# Patient Record
Sex: FEMALE | Race: OTHER | URBAN - METROPOLITAN AREA
[De-identification: names, ages, dates, MRNs, and addresses within clinical notes are randomized per-mention and may not be internally consistent; named-entity substitution may affect disease eponyms.]

---

## 2018-09-06 ENCOUNTER — OFFICE VISIT (OUTPATIENT)
Dept: SURGERY | Age: 63
End: 2018-09-06

## 2018-09-06 ENCOUNTER — TELEPHONE (OUTPATIENT)
Dept: SURGERY | Age: 63
End: 2018-09-06

## 2018-09-06 ENCOUNTER — HOSPITAL ENCOUNTER (OUTPATIENT)
Dept: LAB | Age: 63
Discharge: HOME | End: 2018-09-06
Payer: SELF-PAY

## 2018-09-06 VITALS
HEIGHT: 60 IN | HEART RATE: 67 BPM | OXYGEN SATURATION: 99 % | BODY MASS INDEX: 23.01 KG/M2 | DIASTOLIC BLOOD PRESSURE: 66 MMHG | SYSTOLIC BLOOD PRESSURE: 148 MMHG | WEIGHT: 117.2 LBS | RESPIRATION RATE: 16 BRPM | TEMPERATURE: 98.1 F

## 2018-09-06 DIAGNOSIS — C50.412 MALIGNANT NEOPLASM OF UPPER-OUTER QUADRANT OF LEFT BREAST IN FEMALE, ESTROGEN RECEPTOR POSITIVE (HCC): Primary | ICD-10-CM

## 2018-09-06 DIAGNOSIS — L90.0 LICHEN SCLEROSUS: ICD-10-CM

## 2018-09-06 DIAGNOSIS — Z17.0 MALIGNANT NEOPLASM OF UPPER-OUTER QUADRANT OF LEFT BREAST IN FEMALE, ESTROGEN RECEPTOR POSITIVE (HCC): Primary | ICD-10-CM

## 2018-09-06 DIAGNOSIS — R23.4 BREAST SKIN CHANGES: ICD-10-CM

## 2018-09-06 PROCEDURE — 88305 TISSUE EXAM BY PATHOLOGIST: CPT | Performed by: SURGERY

## 2018-09-06 RX ORDER — SERTRALINE HYDROCHLORIDE 100 MG/1
TABLET, FILM COATED ORAL DAILY
COMMUNITY

## 2018-09-06 RX ORDER — METFORMIN HYDROCHLORIDE 500 MG/1
TABLET ORAL 2 TIMES DAILY WITH MEALS
COMMUNITY

## 2018-09-06 RX ORDER — ANASTROZOLE 1 MG/1
1 TABLET ORAL DAILY
COMMUNITY

## 2018-09-06 RX ORDER — ATORVASTATIN CALCIUM 40 MG/1
TABLET, FILM COATED ORAL DAILY
COMMUNITY

## 2018-09-06 RX ORDER — HYDROCHLOROTHIAZIDE 25 MG/1
25 TABLET ORAL DAILY
COMMUNITY

## 2018-09-06 RX ORDER — BUSPIRONE HYDROCHLORIDE 15 MG/1
15 TABLET ORAL 3 TIMES DAILY
COMMUNITY

## 2018-09-06 RX ORDER — AMLODIPINE BESYLATE 10 MG/1
TABLET ORAL DAILY
COMMUNITY

## 2018-09-06 RX ORDER — LISINOPRIL 40 MG/1
40 TABLET ORAL DAILY
COMMUNITY

## 2018-09-06 NOTE — MR AVS SNAPSHOT
[address] [address] 
[phone] Patient: Sha Trevizo MRN: [id number] RRF:7/8/0507 Visit Information Date & Time Provider Department Dept. Phone Encounter #  
 9/6/2018 11:00 AM Christian Watters MD [address] [id number] Upcoming Health Maintenance Date Due Hepatitis C Screening 1955 DTaP/Tdap/Td series (1 - Tdap) 4/3/1976 PAP AKA CERVICAL CYTOLOGY 4/3/1976 BREAST CANCER SCRN MAMMOGRAM 4/3/2005 FOBT Q 1 YEAR AGE 50-75 4/3/2005 ZOSTER VACCINE AGE 60> 2/3/2015 Influenza Age 5 to Adult 8/1/2018 Allergies as of 9/6/2018  Review Complete On: 9/6/2018 By: Bhavani Hill LPN Not on File Current Immunizations  Never Reviewed No immunizations on file. Not reviewed this visit Vitals BP Pulse Temp Resp Height(growth percentile) Weight(growth percentile) 148/66 (BP 1 Location: Right arm) 67 98.1 °F (36.7 °C) (Oral) 16 5' (1.524 m) 117 lb 3.2 oz (53.2 kg) SpO2 BMI Smoking Status 99% 22.89 kg/m2 Current Every Day Smoker Vitals History BMI and BSA Data Body Mass Index Body Surface Area  
 22.89 kg/m 2 1.5 m 2 Your Updated Medication List  
  
   
This list is accurate as of 9/6/18 11:49 AM.  Always use your most recent med list. amLODIPine 10 mg tablet Commonly known as:  Joan Spruce Take  by mouth daily. anastrozole 1 mg tablet Commonly known as:  ARIMIDEX Take 1 mg by mouth daily. atorvastatin 40 mg tablet Commonly known as:  LIPITOR Take  by mouth daily. busPIRone 15 mg tablet Commonly known as:  BUSPAR Take 15 mg by mouth three (3) times daily. hydroCHLOROthiazide 25 mg tablet Commonly known as:  HYDRODIURIL Take 25 mg by mouth daily. lisinopril 40 mg tablet Commonly known as:  Morena Veliz  
 Take 40 mg by mouth daily. metFORMIN 500 mg tablet Commonly known as:  GLUCOPHAGE Take  by mouth two (2) times daily (with meals). sertraline 100 mg tablet Commonly known as:  ZOLOFT Take  by mouth daily. Introducing [facility]! [facility] introduces InsideView patient portal. Now you can access parts of your medical record, email your doctor's office, and request medication refills online. 1. In your internet browser, go to [email or web address] 2. Click on the First Time User? Click Here link in the Sign In box. You will see the New Member Sign Up page. 3. Enter your InsideView Access Code exactly as it appears below. You will not need to use this code after youve completed the sign-up process. If you do not sign up before the expiration date, you must request a new code. · InsideView Access Code: [email or web address] Expires: 12/5/2018 10:43 AM 
 
4. Enter the last four digits of your Social Security Number (xxxx) and Date of Birth (mm/dd/yyyy) as indicated and click Submit. You will be taken to the next sign-up page. 5. Create a InsideView ID. This will be your InsideView login ID and cannot be changed, so think of one that is secure and easy to remember. 6. Create a InsideView password. You can change your password at any time. 7. Enter your Password Reset Question and Answer. This can be used at a later time if you forget your password. 8. Enter your e-mail address. You will receive e-mail notification when new information is available in [address] 9. Click Sign Up. You can now view and download portions of your medical record. 10. Click the Download Summary menu link to download a portable copy of your medical information. If you have questions, please visit the Frequently Asked Questions section of the InsideView website. Remember, InsideView is NOT to be used for urgent needs. For medical emergencies, dial 911. Now available from your iPhone and Android! Please provide this summary of care documentation to your next provider. Your primary care clinician is listed as Tony Luevano. If you have any questions after today's visit, please call [phone].

## 2018-09-06 NOTE — LETTER
9/6/2018 11:54 AM 
 
Patient:  Melba Jimenez YOB: 1955 Date of Visit: 9/6/2018 Radha Cunningham NP 
[address] [facility] [address] VIA Facsimile: [phone] Millicent Huitron MD 
[address] [place] [address] VIA Facsimile: [phone] Dear BABATUNDE Nolan MD, 
 
 
I had the pleasure of seeing Ms. Melba Jimenez in my office today for left breast skin changes and right axillary adenopathy. I am including a copy of my office visit today. If you have questions, please do not hesitate to call me. I look forward to following Ms. Winston along with you and will keep you updated as to her progress. Sincerely, Rica Hunter MD

## 2018-09-06 NOTE — PROGRESS NOTES
Breat Consult      Ms. Godwin Cash is a 61year old woman with a personal history of left breast ER/VA positive HER negative [id number] grade 2 invasive ductal cell carcinoma and right breast ADH s/p left partial mastectomy with sentinel node biopsy and right excisional breast biopsy by Dr. Darrel Rose 3/11/14 with subsequent radiation and Arimidex who presented to Dr. Teresa Merrill 18 with complaints of left breast changes and right axillary adenopathy. She had been taking Arimidex until approximately three months ago, at which time she came off for about one month but has subsequently resumed. She reports noting itching and skin changes of the left breast for the past few months. She reports attributing the skin changes to scratching. She is otherwise without complaint. She moved from [facility] in 2017. Her twin sister has breast cancer as well. Breast/GYN history:    OB History     No data available           Past Medical History:   Diagnosis Date    Anxiety     Depression     Early onset Alzheimer's dementia     Hypertension     Type II diabetes mellitus ([facility] [address] [facility]        Past Surgical History:   Procedure Laterality Date    DELIVERY       HX BREAST LUMPECTOMY Bilateral [id number]       No current outpatient prescriptions on file prior to visit. No current facility-administered medications on file prior to visit. Not on File    Social History   Substance Use Topics    Smoking status: Current Every Day Smoker    Smokeless tobacco: Never Used    Alcohol use Not on file       No family history on file.       ROS:   General: reports left breast itching denies fevers, chills, night sweats, fatigue, weight loss, weight gain, or change in appetite   GI: denies nausea, vomiting, abdominal pain, change in bowel habits, hematochezia, melena, GERD  Integ: denies dermatitis, abnormal moles  HEENT: denies visual changes, vertigo, epistaxis, dysphagia, hoarseness  Cardiac: denies chest pain, palpitations, HTN, edema, varicosities  Resp: denies cough, shortness of breath, wheezing, hemoptysis, snoring, reactive airway disease  : denies hematuria, dysuria, frequency, urgency, nocturia, stress urinary incontinence   MSK: denies weakness, joint pain, arthritis  Endocrine: denies diabetes, thyroid disease, polyuria, polydipsia, polyphagia, poor wound healing, heat intolerance, cold intolerance  Lymph/Heme: denies anemia, bruising  Neuro: reports new forgetfullness denies dizziness, headache, fainting, seizures, stroke  Psych: reports insomnia, depression and anxiety    Physical Exam:  Visit Vitals    /66 (BP 1 Location: Right arm)    Pulse 67    Temp 98.1 °F (36.7 °C) (Oral)    Resp 16    Ht 5' (1.524 m)    Wt 53.2 kg (117 lb 3.2 oz)    LMP Comment: menopause    SpO2 99%    BMI 22.89 kg/m2       Gen: Well developed, well nourished woman in no acute distress  Head: Normocephalic, atraumatic  Mouth: Clear, no overt lesions, oral mucosa pink and moist  Neck: Supple, no masses, no adenopathy, trachea midline  Resp: Clear bilaterally  Cardio: Regular rate and rhythm  Abdomen: soft, nontender, nondistended  Extremeties: warm, well-perfused  Neuro: sensation and strength grossly intact and symmetrical  Psych: alert and oriented to person, place and time  Breasts:  Right: Examined in both the supine and upright positions. There was no supraclavicular or  infraclavicular lympadenopathy. There were no dominant masses, no skin changes, no asymmetry identified right axillary adenopathy, well healed surgical scar upper inner  Left:  Examined in both the supine and upright positions. There was no supraclavicular, infraclavicular, or axillary lympadenopathy. Skin thickening with pathchy nodules, firm underlying breast tissue greatest in the upper central portion of the breast.  Flattening of the nipple. Well healed surgical scars.   Small mobile nodule approx 2-3mm likely sebaceous cyst upper inner near sternal border      Imagin18 bilateral breast mammogram and right axillary ultrasound ([facility])  New right axillary lymphadenopathy. Recommend further evaluation with ultrasound guided biopsy  Left breast skin thickening is mildly increased since prior examination. Clinical evaluation is recommended. No mammographic evidence of malignancy in the left breast   BIRADS 4    Impression:  Patient Active Problem List   Diagnosis Code    Malignant neoplasm of upper-outer quadrant of left breast in female, [facility] C50.412, Z17.0    Breast skin changes R23.4        61year old woman with a personal history of left breast ER/IL positive HER negative [id number] grade 2 invasive ductal cell carcinoma and right breast ADH s/p left partial mastectomy with sentinel node biopsy and right excisional breast biopsy by Dr. Preeti Hi 3/11/14 with subsequent radiation and Arimidex now with complaints of left breast changes and right axillary adenopathy. Her imaging was reviewed in detail with radiologist prior to her arrival. We discussed there are several possibilities for both the skin changes and the adenopathy. Regarding the skin changes, they could be related to radiation, however it does appear progressive and concerning compared to previous imaging. The right axillary adenopathy is new as well. We discussed both excisional biopsy and ultrasound guided core biopsy. I have recommended ultrasound guided core biopsy of the right axillary adenopathy. I have also recommended and performed left skin punch biopsy. Await additional imaging as ordered by Dr. Fanny Chung. Agree with continuing Arimidex for now. Additional recommendations pending biopsy results. Ms. Marie Tovar prefers to hold off on axillary ultrasound guided core biopsy until she gets the results of the punch biopsy. She was instructed in wound care. Follow up next week. Please call sooner with questions or concerns. MARTHA [facility]  OFFICE PROCEDURE PROGRESS NOTE        Chart reviewed for the following:   Boris Sam MD, have reviewed the History, Physical and updated the Allergic reactions for Fany Winston     TIME OUT performed immediately prior to start of procedure:   I, Phu Almendarez MD, have performed the following reviews on Fany Winston prior to the start of the procedure:            * Patient was identified by name and date of birth   * Agreement on procedure being performed was verified  * Risks and Benefits explained to the patient  * Procedure site verified and marked as necessary  * Patient was positioned for comfort  * Consent was signed and verified     Time: 1130    Date of procedure: 9/6/18    Procedure: left breast skin punch biopsy, 4mm    Procedure performed by:  Phu Almendarez MD    Provider assisted by: Magen Parson LPN    Patient assisted by: nurse    How tolerated by patient: tolerated the procedure well with no complications    Post Procedural Pain Scale: 0 - No Hurt    Anesthesia: lidocaine 2% with epi    Complications: none noted    Comments: The patient was identified and the site confirmed. The skin was cleansed with alcohol. The area was infiltrated with local anesthetic. The 4 mm skin punch biopsy was used. The skin was closed with steristrips. The patient tolerated the procedure without complication. Wound care instructions were given. Follow up 1 week. Please call sooner with questions or concerns.

## 2018-09-11 ENCOUNTER — TELEPHONE (OUTPATIENT)
Dept: SURGERY | Age: 63
End: 2018-09-11

## 2018-09-24 ENCOUNTER — TELEPHONE (OUTPATIENT)
Dept: SURGERY | Age: 63
End: 2018-09-24

## 2018-09-24 NOTE — TELEPHONE ENCOUNTER
Rescheduled appointment, Dr. Liz Kanner out of office for conference on 9.28. Left message to give office a call back to reschedule patient for 9.27.18 at 10:30 or sometime that morning. If afternoon is needed please offer 1:30.

## 2018-10-05 ENCOUNTER — OFFICE VISIT (OUTPATIENT)
Dept: SURGERY | Age: 63
End: 2018-10-05

## 2018-10-05 VITALS
SYSTOLIC BLOOD PRESSURE: 135 MMHG | HEART RATE: 70 BPM | HEIGHT: 60 IN | WEIGHT: 114.8 LBS | DIASTOLIC BLOOD PRESSURE: 64 MMHG | BODY MASS INDEX: 22.54 KG/M2

## 2018-10-05 DIAGNOSIS — C50.412 MALIGNANT NEOPLASM OF UPPER-OUTER QUADRANT OF LEFT BREAST IN FEMALE, ESTROGEN RECEPTOR POSITIVE (HCC): Primary | ICD-10-CM

## 2018-10-05 DIAGNOSIS — Z17.0 MALIGNANT NEOPLASM OF UPPER-OUTER QUADRANT OF LEFT BREAST IN FEMALE, ESTROGEN RECEPTOR POSITIVE (HCC): Primary | ICD-10-CM

## 2018-10-05 RX ORDER — ERGOCALCIFEROL 1.25 MG/1
50000 CAPSULE ORAL
COMMUNITY

## 2018-10-05 NOTE — PROGRESS NOTES
Kay Follow up      Ms. Maryann Bence is a 61year old woman with a personal history of left breast ER/VA positive HER negative [id number] grade 2 invasive ductal cell carcinoma and right breast ADH s/p left partial mastectomy with sentinel node biopsy and right excisional breast biopsy by Dr. Sandra Guillory 3/11/14 with subsequent radiation and Arimidex who presented to Dr. Brendan Sarmiento 18 with complaints of left breast changes and right axillary adenopathy. She had been taking Arimidex until approximately three months prior, at which time she came off for about one month but subsequently resumed. She reported noting itching and skin changes of the left breast for the past few months. She reported attributing the skin changes to scratching. She was otherwise without complaint. She moved from [facility] in [facility]. Her twin sister has breast cancer as well. Skin punch biopsy and axillary lymph node biopsy were both negative. Breast/GYN history:    OB History     No data available           Past Medical History:   Diagnosis Date    Anxiety     Depression     Early onset Alzheimer's dementia     Hypertension     Type II diabetes mellitus ([facility] [address] [facility]        Past Surgical History:   Procedure Laterality Date    DELIVERY       HX BREAST LUMPECTOMY Bilateral [id number]       Current Outpatient Prescriptions on File Prior to Visit   Medication Sig Dispense Refill    anastrozole (ARIMIDEX) 1 mg tablet Take 1 mg by mouth daily.  hydroCHLOROthiazide (HYDRODIURIL) 25 mg tablet Take 25 mg by mouth daily.  atorvastatin (LIPITOR) 40 mg tablet Take  by mouth daily.  metFORMIN (GLUCOPHAGE) 500 mg tablet Take  by mouth two (2) times daily (with meals).  amLODIPine (NORVASC) 10 mg tablet Take  by mouth daily.  busPIRone (BUSPAR) 15 mg tablet Take 15 mg by mouth three (3) times daily.  lisinopril (PRINIVIL, ZESTRIL) 40 mg tablet Take 40 mg by mouth daily.       sertraline (ZOLOFT) 100 mg tablet Take  by mouth daily. No current facility-administered medications on file prior to visit. Not on File    Social History   Substance Use Topics    Smoking status: Current Every Day Smoker    Smokeless tobacco: Never Used    Alcohol use Not on file       No family history on file. ROS:   General: reports left breast itching denies fevers, chills, night sweats, fatigue, weight loss, weight gain, or change in appetite   GI: denies nausea, vomiting, abdominal pain, change in bowel habits, hematochezia, melena, GERD  Integ: denies dermatitis, abnormal moles  HEENT: denies visual changes, vertigo, epistaxis, dysphagia, hoarseness  Cardiac: denies chest pain, palpitations, HTN, edema, varicosities  Resp: denies cough, shortness of breath, wheezing, hemoptysis, snoring, reactive airway disease  : denies hematuria, dysuria, frequency, urgency, nocturia, stress urinary incontinence   MSK: denies weakness, joint pain, arthritis  Endocrine: denies diabetes, thyroid disease, polyuria, polydipsia, polyphagia, poor wound healing, heat intolerance, cold intolerance  Lymph/Heme: denies anemia, bruising  Neuro: reports new forgetfullness denies dizziness, headache, fainting, seizures, stroke  Psych: reports insomnia, depression and anxiety    Physical Exam:  Visit Vitals    /64 (BP 1 Location: Right arm, BP Patient Position: Sitting)    Pulse 70    Ht 5' (1.524 m)    Wt 52.1 kg (114 lb 12.8 oz)    BMI 22.42 kg/m2       Gen:  Well developed, well nourished woman in no acute distress  Head: Normocephalic, atraumatic  Mouth: Clear, no overt lesions, oral mucosa pink and moist  Neck: Supple, no masses, no adenopathy, trachea midline  Resp: Clear bilaterally  Cardio: Regular rate and rhythm  Abdomen: soft, nontender, nondistended  Extremeties: warm, well-perfused  Neuro: sensation and strength grossly intact and symmetrical  Psych: alert and oriented to person, place and time  Breasts:  Right: Examined in both the supine and upright positions. There was no supraclavicular or  infraclavicular lympadenopathy. There were no dominant masses, no skin changes, no asymmetry identified right axillary adenopathy, well healed surgical scar upper inner  Left:  Examined in both the supine and upright positions. There was no supraclavicular, infraclavicular, or axillary lympadenopathy. Skin thickening with pathchy nodules, firm underlying breast tissue greatest in the upper central portion of the breast.  Flattening of the nipple. Well healed surgical scars. Small mobile nodule approx 2-3mm likely sebaceous cyst upper inner near sternal border, biopsy site healing well    Pathology  9/10/18   SKIN, LEFT BREAST, BIOPSY:   LICHEN SCLEROSUS (SEE COMMENT). NEGATIVE FOR MALIGNANCY. Imagin18 bilateral breast mammogram and right axillary ultrasound ([facility])  New right axillary lymphadenopathy. Recommend further evaluation with ultrasound guided biopsy  Left breast skin thickening is mildly increased since prior examination. Clinical evaluation is recommended. No mammographic evidence of malignancy in the left breast   BIRADS 4    Impression:  Patient Active Problem List   Diagnosis Code    Malignant neoplasm of upper-outer quadrant of left breast in female, estrogen receptor positive ([facility] C50.412, Z17.0    Breast skin changes R23.4        61year old woman with a personal history of left breast ER/DE positive HER negative [id number] grade 2 invasive ductal cell carcinoma and right breast ADH s/p left partial mastectomy with sentinel node biopsy and right excisional breast biopsy by Dr. Buzz Bellamy 3/11/14 with subsequent radiation and Arimidex. Both the left breast changes and right axillary adenopathy were negative for malignancy. Continue Arimidex per Dr. Rose Bowser. If the breast changes are problematic, we will refer to dermatology. Follow up 1 year. Please call sooner with questions or concerns.

## 2018-10-05 NOTE — MR AVS SNAPSHOT
[place] [id number] [address] 
[phone] Patient: Brooklyn Mccormack MRN: [id number] YSO:0[birth year] Visit Information Date & Time Provider Department Dept. Phone Encounter #  
 10/5/2018 10:30 AM Ken Chris MD [facility] [address] [facility] [id number] Follow-up Instructions Return in about 1 year (around 10/5/2019). Follow-up and Disposition History Upcoming Health Maintenance Date Due Hepatitis C Screening 1955 Pneumococcal 19-64 Highest Risk (1 of 3 - PCV13) 4/3/1974 DTaP/Tdap/Td series (1 - Tdap) 4/3/1976 PAP AKA CERVICAL CYTOLOGY 4/3/1976 Shingrix Vaccine Age 50> (1 of 2) 4/3/2005 BREAST CANCER SCRN MAMMOGRAM 4/3/2005 FOBT Q 1 YEAR AGE 50-75 4/3/2005 Influenza Age 5 to Adult 8/1/2018 Allergies as of 10/5/2018  Review Complete On: 10/5/2018 By: Ken Chris MD  
 Not on File Current Immunizations  Never Reviewed No immunizations on file. Not reviewed this visit You Were Diagnosed With   
  
 Codes Comments Malignant neoplasm of upper-outer quadrant of left breast in female, estrogen receptor positive ([facility]    -  Primary ICD-10-CM: C50.412, Z17.0 ICD-9-CM: 174.4, V86.0 Vitals BP Pulse Height(growth percentile) Weight(growth percentile) BMI Smoking Status 135/64 (BP 1 Location: Right arm, BP Patient Position: Sitting) 70 5' (1.524 m) 114 lb 12.8 oz (52.1 kg) 22.42 kg/m2 Current Every Day Smoker BMI and BSA Data Body Mass Index Body Surface Area  
 22.42 kg/m 2 1.49 m 2 Your Updated Medication List  
  
   
This list is accurate as of 10/5/18 10:59 AM.  Always use your most recent med list. amLODIPine 10 mg tablet Commonly known as:  Gayatri Leaver Take  by mouth daily. anastrozole 1 mg tablet Commonly known as:  ARIMIDEX Take 1 mg by mouth daily. atorvastatin 40 mg tablet Commonly known as:  LIPITOR Take  by mouth daily. busPIRone 15 mg tablet Commonly known as:  BUSPAR Take 15 mg by mouth three (3) times daily. hydroCHLOROthiazide 25 mg tablet Commonly known as:  HYDRODIURIL Take 25 mg by mouth daily. lisinopril 40 mg tablet Commonly known as:  Kulwant Room Take 40 mg by mouth daily. metFORMIN 500 mg tablet Commonly known as:  GLUCOPHAGE Take  by mouth two (2) times daily (with meals). sertraline 100 mg tablet Commonly known as:  ZOLOFT Take  by mouth daily. VITAMIN D2 50,000 unit capsule Generic drug:  ergocalciferol Take 50,000 Units by mouth. Follow-up Instructions Return in about 1 year (around 10/5/2019). To-Do List   
 Around 09/02/2019 Imaging:  [facility] MAMMO BI SCREENING INCL CAD Introducing [facility]! [address] [facility] introduces Ringz.[employer] patient portal. Now you can access parts of your medical record, email your doctor's office, and request medication refills online. 1. In your internet browser, go to [email or web address] 2. Click on the First Time User? Click Here link in the Sign In box. You will see the New Member Sign Up page. 3. Enter your Ringz.TV Access Code exactly as it appears below. You will not need to use this code after youve completed the sign-up process. If you do not sign up before the expiration date, you must request a new code. · [email or web address][employer] Access Code: [email or web address] Expires: 12/5/2018 10:43 AM 
 
4. Enter the last four digits of your Social Security Number (xxxx) and Date of Birth (mm/dd/yyyy) as indicated and click Submit. You will be taken to the next sign-up page. 5. Create a Ringz.TV ID. This will be your Ringz.TV login ID and cannot be changed, so think of one that is secure and easy to remember. 6. Create a [email or web address] password. You can change your password at any time. 7. Enter your Password Reset Question and Answer. This can be used at a later time if you forget your password. 8. Enter your e-mail address. You will receive e-mail notification when new information is available in [address] 9. Click Sign Up. You can now view and download portions of your medical record. 10. Click the Download Summary menu link to download a portable copy of your medical information. If you have questions, please visit the Frequently Asked Questions section of the Chartboost website. Remember, Chartboost is NOT to be used for urgent needs. For medical emergencies, dial 911. Now available from your iPhone and Android! Please provide this summary of care documentation to your next provider. Your primary care clinician is listed as Fabiola Casillas. If you have any questions after today's visit, please call [phone].

## 2018-10-05 NOTE — PROGRESS NOTES
Chief Complaint   Patient presents with    Surgical Follow-up     left breast skin punch biopsy with 4mm. Denies complaints. 1. Have you been to the ER, urgent care clinic since your last visit? Hospitalized since your last visit? No    2. Have you seen or consulted any other health care providers outside of the [address] [place] [address] since your last visit? Include any pap smears or colon screening.  No

## 2018-10-05 NOTE — LETTER
10/5/2018 10:58 AM 
 
Patient:  Ann Simpson YOB: 1955 Date of Visit: 10/5/2018 Thao Carrasquillo NP 
[address] [address] VIA Facsimile: [phone] Swapnil Wiseman MD 
[address] [address] VIA Facsimile: [phone] Dear BABATUNDE Farris MD, 
 
 
I had the pleasure of seeing Ms. Ann Simpson in my office today for follow up of her breast cancer after left skin punch biopsy and right axillary node biopsy, both of which returned as benign. I am including a copy of my office visit today. If you have questions, please do not hesitate to call me. I look forward to following Ms. Winston along with you and will keep you updated as to her progress. Sincerely, Robert Morrissey MD

## 2020-03-02 LAB — MAMMOGRAPHY, EXTERNAL: NORMAL
